# Patient Record
Sex: MALE | Race: WHITE | NOT HISPANIC OR LATINO | URBAN - METROPOLITAN AREA
[De-identification: names, ages, dates, MRNs, and addresses within clinical notes are randomized per-mention and may not be internally consistent; named-entity substitution may affect disease eponyms.]

---

## 2021-03-01 ENCOUNTER — EMERGENCY (EMERGENCY)
Facility: HOSPITAL | Age: 46
LOS: 1 days | Discharge: HOME | End: 2021-03-01
Attending: STUDENT IN AN ORGANIZED HEALTH CARE EDUCATION/TRAINING PROGRAM | Admitting: STUDENT IN AN ORGANIZED HEALTH CARE EDUCATION/TRAINING PROGRAM
Payer: COMMERCIAL

## 2021-03-01 VITALS
WEIGHT: 128.09 LBS | RESPIRATION RATE: 15 BRPM | DIASTOLIC BLOOD PRESSURE: 74 MMHG | TEMPERATURE: 98 F | HEIGHT: 70 IN | SYSTOLIC BLOOD PRESSURE: 111 MMHG | OXYGEN SATURATION: 96 % | HEART RATE: 79 BPM

## 2021-03-01 VITALS
RESPIRATION RATE: 18 BRPM | HEART RATE: 71 BPM | DIASTOLIC BLOOD PRESSURE: 77 MMHG | SYSTOLIC BLOOD PRESSURE: 109 MMHG | TEMPERATURE: 98 F | OXYGEN SATURATION: 97 %

## 2021-03-01 PROCEDURE — 90471 IMMUNIZATION ADMIN: CPT

## 2021-03-01 PROCEDURE — 99283 EMERGENCY DEPT VISIT LOW MDM: CPT | Mod: 25

## 2021-03-01 PROCEDURE — 90715 TDAP VACCINE 7 YRS/> IM: CPT

## 2021-03-01 PROCEDURE — 12001 RPR S/N/AX/GEN/TRNK 2.5CM/<: CPT

## 2021-03-01 RX ORDER — TETANUS TOXOID, REDUCED DIPHTHERIA TOXOID AND ACELLULAR PERTUSSIS VACCINE, ADSORBED 5; 2.5; 8; 8; 2.5 [IU]/.5ML; [IU]/.5ML; UG/.5ML; UG/.5ML; UG/.5ML
0.5 SUSPENSION INTRAMUSCULAR ONCE
Refills: 0 | Status: COMPLETED | OUTPATIENT
Start: 2021-03-01 | End: 2021-03-01

## 2021-03-01 RX ORDER — AZTREONAM 2 G
1 VIAL (EA) INJECTION
Qty: 20 | Refills: 0
Start: 2021-03-01 | End: 2021-03-10

## 2021-03-01 RX ADMIN — TETANUS TOXOID, REDUCED DIPHTHERIA TOXOID AND ACELLULAR PERTUSSIS VACCINE, ADSORBED 0.5 MILLILITER(S): 5; 2.5; 8; 8; 2.5 SUSPENSION INTRAMUSCULAR at 16:25

## 2021-03-01 RX ADMIN — Medication 1 TABLET(S): at 16:26

## 2021-03-01 NOTE — ED PROVIDER NOTE - ATTENDING CONTRIBUTION TO CARE
left thumb lac while at work today, able to flex and extend, sensation intact  lac repair, tetanus, dc

## 2021-03-01 NOTE — ED ADULT NURSE NOTE - OBJECTIVE STATEMENT
Pt. received alert and oriented x3 with chief complaint of left hand first finger laceration while at work.

## 2021-03-01 NOTE — ED PROVIDER NOTE - CARE PROVIDER_API CALL
Dakota Wright)  Surgery  [address] [place] [address]  Phone: [phone]  Fax: [phone]  Follow Up Time: 1-3 Days

## 2021-03-01 NOTE — ED PROVIDER NOTE - OBJECTIVE STATEMENT
left posterior thumb laceration sustained at work today, needs tetanus, former smoker.  no joint pain. 46 y male presents with 2 cm  left posterior thumb laceration sustained at work today, needs tetanus, former smoker.  no joint pain.

## 2021-03-01 NOTE — ED PROVIDER NOTE - PROGRESS NOTE DETAILS
patient resting comfortably, laceration sutured, rx bactrim sent to pharmacy, given information for hand surgeon Dr Wright, call tomorrow to arrange follow up, suture removal in 10 days, any signs of infection return to ED, change dressing daily apply bacitracin,  wash daily in shower with soap and water

## 2021-03-01 NOTE — ED PROVIDER NOTE - NSFOLLOWUPINSTRUCTIONS_ED_ALL_ED_FT
rx bactrim sent to pharmacy, given information for hand surgeon Dr Wright, call tomorrow to arrange follow up, suture removal in 10 days, any signs of infection return to ED, change dressing daily apply bacitracin,  wash daily in shower with soap and water.

## 2021-03-01 NOTE — ED PROVIDER NOTE - PATIENT PORTAL LINK FT
You can access the FollowMyHealth Patient Portal offered by [facility] by registering at the following website: [email or web address]. By joining [employer]’s FollowMyHealth portal, you will also be able to view your health information using other applications (apps) compatible with our system.

## 2021-03-01 NOTE — ED PROVIDER NOTE - CLINICAL SUMMARY MEDICAL DECISION MAKING FREE TEXT BOX
left posterior thumb laceration sustained today at work, rx abx, tdap given,  nvi, no joint pain.  laceration sutured, follow up with hand specialist

## 2023-06-26 ENCOUNTER — OFFICE (OUTPATIENT)
Dept: URBAN - METROPOLITAN AREA CLINIC 35 | Facility: CLINIC | Age: 48
Setting detail: OPHTHALMOLOGY
End: 2023-06-26
Payer: COMMERCIAL

## 2023-06-26 DIAGNOSIS — H43.393: ICD-10-CM

## 2023-06-26 DIAGNOSIS — H11.152: ICD-10-CM

## 2023-06-26 DIAGNOSIS — H52.4: ICD-10-CM

## 2023-06-26 DIAGNOSIS — H52.7: ICD-10-CM

## 2023-06-26 PROCEDURE — 99204 OFFICE O/P NEW MOD 45 MIN: CPT | Performed by: OPHTHALMOLOGY

## 2023-06-26 PROCEDURE — 92015 DETERMINE REFRACTIVE STATE: CPT | Performed by: OPHTHALMOLOGY

## 2023-06-26 ASSESSMENT — TONOMETRY
OD_IOP_MMHG: 11
OS_IOP_MMHG: 11

## 2023-06-26 ASSESSMENT — CONFRONTATIONAL VISUAL FIELD TEST (CVF)
OS_FINDINGS: FULL
OD_FINDINGS: FULL

## 2023-06-27 ASSESSMENT — REFRACTION_AUTOREFRACTION
OD_AXIS: 135
OD_SPHERE: +1.00
OS_CYLINDER: +1.00
OS_AXIS: 073
OD_CYLINDER: +0.25
OS_SPHERE: +0.50

## 2023-06-27 ASSESSMENT — REFRACTION_MANIFEST
OS_CYLINDER: +0.75
OD_VA1: 20/20
OS_ADD: +1.75
OS_SPHERE: +0.25
OD_CYLINDER: +0.25
OD_SPHERE: +0.75
OD_ADD: +2.25
OD_CYLINDER: SPH
OD_VA1: 20/20
OD_ADD: +1.75
OS_ADD: +2.25
OS_VA1: 20/20
OS_CYLINDER: SPH
OS_SPHERE: PLANO
OD_SPHERE: +0.25
OS_AXIS: 070
OS_VA1: 20/20
OD_AXIS: 135

## 2023-06-27 ASSESSMENT — REFRACTION_CURRENTRX
OD_OVR_VA: 20/
OD_ADD: +1.75
OD_AXIS: 47
OD_CYLINDER: -0.25
OD_SPHERE: +0.75
OS_AXIS: 137
OS_OVR_VA: 20/
OS_CYLINDER: -0.50
OS_SPHERE: PLANO
OS_ADD: +1.75

## 2023-06-27 ASSESSMENT — AXIALLENGTH_DERIVED
OD_AL: 23.1616
OS_AL: 23.4329
OD_AL: 23.2559

## 2023-06-27 ASSESSMENT — SPHEQUIV_DERIVED
OS_SPHEQUIV: 1
OD_SPHEQUIV: 0.875
OD_SPHEQUIV: 1.125

## 2023-06-27 ASSESSMENT — KERATOMETRY
OD_K1POWER_DIOPTERS: 43.25
OD_K2POWER_DIOPTERS: 43.75
OS_K1POWER_DIOPTERS: 42.25
OS_AXISANGLE_DEGREES: 067
OS_K2POWER_DIOPTERS: 43.50
OD_AXISANGLE_DEGREES: 131

## 2023-06-27 ASSESSMENT — VISUAL ACUITY
OS_BCVA: 20/40-
OD_BCVA: 20/25-